# Patient Record
Sex: MALE | Race: WHITE | NOT HISPANIC OR LATINO | URBAN - METROPOLITAN AREA
[De-identification: names, ages, dates, MRNs, and addresses within clinical notes are randomized per-mention and may not be internally consistent; named-entity substitution may affect disease eponyms.]

---

## 2017-10-09 ENCOUNTER — OFFICE VISIT - HEALTHEAST (OUTPATIENT)
Dept: ADDICTION MEDICINE | Facility: CLINIC | Age: 62
End: 2017-10-09

## 2017-10-09 DIAGNOSIS — F12.10 CANNABIS USE DISORDER, MILD, ABUSE: ICD-10-CM

## 2017-10-09 DIAGNOSIS — F15.10 AMPHETAMINE USE DISORDER, MILD (H): ICD-10-CM

## 2017-11-14 ENCOUNTER — OFFICE VISIT - HEALTHEAST (OUTPATIENT)
Dept: ADDICTION MEDICINE | Facility: CLINIC | Age: 62
End: 2017-11-14

## 2017-11-14 DIAGNOSIS — F15.20 METHAMPHETAMINE USE DISORDER, MODERATE, DEPENDENCE (H): ICD-10-CM

## 2021-06-13 NOTE — PROGRESS NOTES
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 (304.90)       Suggested Level of Care Necessary for Recovery  []  Inpatient  []  Extended Care []  Residential []  Outpatient  [x]  None            Collateral Contact Summary   Number of contacts made:  none  Contact with referring person:  no     If court related records were reviewed, summarize here:  none     []   Information from collateral contacts supported/largely agreed with information from the client and associated risk ratings.   []   Information from collateral contacts was significantly different from information from the client and lead to different risk ratings.      Summarize new information here:      Rule 25 Assessment Summary and Plan   's Recommendation    Ct meets criteria for cannabis use disorder - mild (F12.10) (305.20)  Methamphetamine use disorder - mild (F15.10) (305.70) Amphetamine type substance      Recommendation for continued pain management and consideration for Medical Cannabis Program  If use increases ct was recommended for Update  Ct states that he will be busy and out of town until winter and is not ready to engage in treatment at this time.          Collateral Contacts     Please duplicate this page for each contact.  If this includes  information which is sensitive and not public, separate this page from the rest of the assessment before sharing.  Retain the page in the assessment file.   Name     Relationship     Phone Number     Releases           Information Provided:          Collateral Contacts     Name       Relationship     Phone Number     Releases           Information Provided:        Staff Name and Title:  Dionna Vega MA [facility]      Date:  10/9/2017  Time:  11:31 AM

## 2021-06-14 NOTE — PROGRESS NOTES
[facility] Updated [facility] Health Assessment    Date: 2017   : Lauren Mayer      Name: [place]        Address: [address] [address]  : 1955  Age: 62 y.o.   Race: White or    Marital Status:  Phone: [phone] (home)   SS#: [id number]  Referral Source: Insurance      Recommendations: At this time, the client meets criteria for Stimulant (Methamphetamine) Use Disorder-Moderate (F15.20).  It appears that he is appropriate for and would benefit from Inpatient CD treatment services to address CD and MH issues, including withdrawal symptoms, and pain management.    Service Requested: IP  Employment: Disabled since .  Health Insurance: Medicare/ [employer]      Dimension I Acute intoxication/Withdrawal potential    Symptomology (past 12 months):    AM use, protecting supply,  medicinal use, using alone, mood swings, loss of control    Observed or reported (withdrawal symptoms):   Depression, Muscle aches, Fatigue, Headache,  Diarrhea, GI upset, Irritability, IInsomnia, Loss of appetite,  Drug Last Use Amount Frequency Route   Alcohol 13 1-2 beers most days 2017 oral   Marijuana 13 Occasionally, every couple of weeks - tried for pain, doesn't do much. 2017 smoke   Cocaine/Crack 20  40 years ago snort   Opiates/Heroin 42 Pain pills-usually as prescribed, sometimes more    Heroin-1X - 40 years ago-needle Still taking occasionally earlier this year () Oral        injected   Hallucinogens  LSD, mushrooms, mescaline, etc 20 years ago oral   Sedatives/Benzos       Amphetamine/Meth Later 30s Meth-lick finger, stick in bag, lick meth off finger - once per day.  For pain. In AM. 2017 oral   Inhalants       Tobacco 30 years ago Chew-3-4 times per week 1 can lasts 1.5 weeks 2017     Dimension I Risk Rating :  2      Reason Risk Rating Assigned: Client reports that he has tried to quit his daily use of meth and experienced significant  "WD symptoms (especially \"feeling sick\" and \"getting very mean\", also nausea, headache, muscle aches, sleep issues) that are typical of stimulant withdrawal.  Reported last dates of use are meth-11/13/2017; alcohol-11/13/2017; marijuana-11/1/2017.      Dimension II Biomedical Conditions    Any known health conditions: Yes  Is use related to health problem/concern: Yes, using meth is my pain relief  PCP:  Dr Eric Leone, [facility].  [facility] clinic: [facility]  (nerve ablation about 2X per year - no meds, no PT)  MD documents physical deterioration due to use: Yes - PCP knows about meth use, says \"Quit it.\"  Knows I'm looking for IP treatment. Prescribed a 5 day run of Prednisone (taking right now) to help with stopping meth use  List Health Concerns/Conditions: Chronic back pain; chronic diarrhea (take Imodium AD a lot); depression; \"bad\" hip, knee, and shoulder    Dimension II Risk Rating :  3  Reason Risk Rating Assigned: Client states that he uses meth daily solely in response to his chronic back pain and that meth is more effective than other methods he has used, including marijuana and opiates. Client accesses services with his PCP and pain clinic.                   Dimension III Emotional/Behavioral/Cognitive    Oriented to: person, place, time and situation  Current Mental Health Services: no, meds only  Hospitalization for MH or psychiatric problems: No  How many Hospitalizations: 0  Last Hospitalization; date and location:   Diagnoses: Depression  Psychiatrist: Dr Umana       Clinic: [facility]  Current Medications: Cymbalta  Taking medications as prescribed: Yes (wife: but he forgets a lot)     Medications Helpful: Yes (wife definitely agrees)  Current Suicide ideation: No (The thought passes through my brain occasionally, can't dwell on it, don't dwell on it)  If yes, any plan? What is plan?: None  Previous Suicide Attempts? (explain): No  Made Referral to MH Center: " No    Dimension III Risk Rating :  2      Reason Risk Rating Assigned: Client reports he is diagnosed w/depression, prescribed Cymbalta, takes as prescribed (although his wife reports he often forgets to take it.)  Client's wife also reports that he goes through cycles of being calm for a couple of weeks, then has periods of intense activity for a week or 2  that have a manic quality, e.g. starting one project, then another, then another; also a lot of nighttime activity - suddenly leaving for their cabin at [facility] or mowing the lawn at 3 AM.  Client reports occasional passive SI, no current or past intent/plans.  Patient scored High Risk on PANSI screen.                  Dimension IV Readiness to Change    Mandated, or coerced into assessment or treatment:  No  Does client feel there is a problem:  Yes  Verbalization of need/desire to change: Yes  Impression of : (delete those that do not apply)  Cooperative, genuinely motivated,     Dimension IV Risk Rating :  0    Reason Risk Rating Assigned: Client states he is seeking IP treatment because he has tried to stop meth use on his own and is unable to do that - appears quite motivated to engage in treatment in a meaningful way to arrest use and make necessary changes to maintain abstinence.                  Dimension V Relapse/Continued Use/Continued Problem Potential    Lifetime # of CD Treatments: 0  List program, dates, and status of completion: NA    Dimension V Risk Ratin    Reason Risk Rating Assigned: Client reports no previous CD treatment experiences and appears to have no knowledge of coping strategies to prevent relapse, or of the effect of substance use on his MH symptoms or vice versa.  Client risk of further use is very high, based on his unsuccessful attempts to quit using meth when it remains available to him. Reported last dates of use are meth-2017; alcohol-2017; marijuana-2017.                  Dimension VI  "Recovery Environment    Sober Family support:  Yes.  Wife, her brother who is sober  Sober friend support: No - will have to get myself away from those guys (long-time friends) that use.  (Wife reports some sober friends)  Connected to sober support network: Went to  for a couple of weeks - still using, but a start of the process.  Not attending right now - it would be hypocritical to go when I'm still using.  Current living circumstances: Live with wife  Environment supportive of recovery: Yes  Spiritual/Amish needs: Yes, want to address as part of this process  Cultural needs: NA  Family history of CD/MH issues: Not really (uncle was on depression meds)  Family size: 2          Number of children: 3 adult children, 4 grandchildren - all live nearby  Current Legal Concerns: No explain: NA  Pregnancy Concerns: N/A  Mothers First Contacted: N/A  Child Protection Involvement: N/A      Dimension VI Risk Rating :  2    Reason Risk Rating Assigned: Client reports he has been disabled and receiving benefits for about 10 years.  Client reports having supportive wife and family, but that his friend group consists primarily of using peers.  He reports keeping very busy maintaining his home and cabin properties, doesn't report any other sober, enjoyable leisure activities, or current participation in sober support groups.  Client and wife report that he needs to learn how to \"relax\"; also, that he hopes to establish spiritual practices as part of his sobriety.  No current legal involvements      RUFINO Stewart  11/14/2017  10:54 AM    "

## 2021-06-16 PROBLEM — F15.20 SEVERE METHAMPHETAMINE USE DISORDER (H): Status: ACTIVE | Noted: 2017-12-11
